# Patient Record
Sex: MALE | Race: WHITE | NOT HISPANIC OR LATINO | URBAN - METROPOLITAN AREA
[De-identification: names, ages, dates, MRNs, and addresses within clinical notes are randomized per-mention and may not be internally consistent; named-entity substitution may affect disease eponyms.]

---

## 2024-02-05 DIAGNOSIS — Z76.0 MEDICATION REFILL: ICD-10-CM

## 2024-02-05 RX ORDER — LISINOPRIL 10 MG/1
10 TABLET ORAL DAILY
Qty: 90 TABLET | Refills: 1 | Status: SHIPPED | OUTPATIENT
Start: 2024-02-05

## 2024-02-05 RX ORDER — LISINOPRIL 10 MG/1
10 TABLET ORAL DAILY
COMMUNITY
End: 2024-02-05 | Stop reason: SDUPTHER

## 2024-02-05 NOTE — TELEPHONE ENCOUNTER
Pt's wife left  requesting a refill of pt's lisinopril to be sent to [facility] pharmacy. Pt's wife is requesting a 90-day supply.

## 2024-08-05 DIAGNOSIS — Z76.0 MEDICATION REFILL: ICD-10-CM

## 2024-08-05 RX ORDER — LISINOPRIL 10 MG/1
10 TABLET ORAL DAILY
Qty: 30 TABLET | Refills: 0 | Status: SHIPPED | OUTPATIENT
Start: 2024-08-05

## 2024-08-05 NOTE — TELEPHONE ENCOUNTER
Pt is not a current patient, has not been seen since 12/18/2018. Pharmacy requesting refill(s) of the populated rx(s). Please refuse rx.

## 2025-04-22 ENCOUNTER — APPOINTMENT (OUTPATIENT)
Dept: PRIMARY CARE | Facility: CLINIC | Age: 65
End: 2025-04-22
Payer: COMMERCIAL

## 2025-05-04 NOTE — PROGRESS NOTES
"Subjective   Reji BRIAN Llanos is a 64 y.o. male who presents for new patient visit to establish PCP care care for annual physical exam.    HPI:        64 y.o. male who presents for new patient visit to establish PCP care care for annual physical exam.     EMR/EPIC records reviewed    PMHx:  HTN       Healthcare Providers:  Prior PCP Dr. Carlos        Preventive Health Services:  -Last physical: 5/6/2025  -last PSA: 2018  -last colonoscopy/cologuard: ?  -last STI screening: ?  -Hep C screening: ?       Immunizations:  -Childhood vaccines: completed per patient  -updated COVID spike vaccine: NOW DUE  -TDAP: 2019  -Prevnar 20: DUE NOW  - Shingrix: Now due  - RSV: NOW DUE    Immunization History   Administered Date(s) Administered    Moderna COVID-19 vaccine, 12 years and older (50mcg/0.5mL)(Spikevax) 10/11/2024    Moderna SARS-CoV-2 Vaccination 03/15/2021, 04/12/2021, 12/30/2021                  Today Reji reports:     - doing and feeling well    - Ran out of blood pressure medications and is not taking, and is requesting refills of lisinopril 10 mg daily     -taking taking over-the-counter Flonase for seasonal allergies.  Patient expressed interest in starting cetirizine 10 mg daily for ongoing seasonal allergies sxs, not worsening over time.       Today  he has no other reported complaints, issues, or problems.  ROS is NEG for HEADACHE, NAUSEA, VOMITING, DIARRHEA, CHEST PAIN, SOB, and BLEEDING.   Review of systems (12) is negative for all systems except for any identified issues in HPI above.        Objective     BP (!) 150/98   Pulse 93   Temp 36.7 °C (98 °F)   Ht 1.67 m (5' 5.75\")   Wt 107 kg (235 lb 9.6 oz)   SpO2 97%   BMI 38.32 kg/m²      Physical Examination: patient declined chaperone       GENERAL           General Appearance: pleasant, well-appearing, well-developed, well-hydrated, well-nourished, well-groomed.        HEENT           NECK supple, Neg for adneopathy no thyroid enlargement or " nodules, Oropharynx normal no exudates.  Ears: TMs intact, normal, no effusions       EYES           Pupils: PERRLA, no photophobia.        HEART           Rate and Rhythm regular rate and rhythm. Heart sounds: normal S1S2. Murmurs: none.        LUNGS           Effort: Normal chest wall, no pectus, Normal air entry all fields, Clear to IPPA, RR<16 with no use of accessory muscles.        BACK           General: unremarkable, no spinal tenderness or rashes.        ABDOMEN           General: soft, obese, nondistended nontender to palpation, + palpable ventral wall abdominal hernia versus diastases recti with no signs of incarceration, no peritoneal signs, normal to inspection, no HSM neg for LKKS or masses and BSs heard in all quadrants                 LYMPHATICS           Cervical: none. Axillary: none.        MUSCULOSKELETAL           gross abnormalities no gross abnormalities, no joint redness or swelling.        EXTREMITIES           Varicose veins: not present. Pulses: 2+ bilateral. Clubbing: none. Cyanosis: no.        NEUROLOGICAL           Orientation: alert and oriented x 3. Grossly normal: yes. Plantars: downgoing bilaterally. Muscle Bulk: normal . Cranial Nerves: CN's II-XII grossly intact.        PSYCHOLOGY           Affect: appropriate. Mood: pleasant.        Skin: No rashes.  No lesions       (patient declined chaperone): Penis: normal circumcised phallus no lesions. Scrotum: no lesions. Testicles: descended bilaterally, non-tender to palpation, no palpable masses or inguinal hernias. No inguinal LAD.      Assessment/Plan   Problem List Items Addressed This Visit    None  Visit Diagnoses         Annual physical exam    -  Primary      Primary hypertension          Lipid screening          Vitamin D deficiency          Encounter for hepatitis C screening test for low risk patient          Routine screening for STI (sexually transmitted infection)          Prostate cancer screening          Colon cancer  screening          Immunization counseling          Encounter for screening for coronary artery disease          Medication refill              Annual Physical Exam: Completed today  -labs ordered (see A/P above)    HTN: uncontrolled; ran out of blood pressure medications  -CMP, UA ordered  -start lisinopril 10 mg daily in AM  -low salt, low cholesterol diet, regularly exercise, and limit alcohol intake    Snoring: Rule out sleep apnea  - Referral to sleep medicine ordered    Seasonal allergies: Symptomatic  - Continue OTC Flonase daily  - Start cetirizine 10 mg daily    BMI 38:  - Weight loss encouraged  - Low carbohydrate, low-salt, low-cholesterol and fat diet, regularly exercise and limit alcohol intake    Ventral wall abdominal hernia: no red flag signs or symptoms  - Referral to general surgery ordered for evaluation  - Emergency department and 911/EMS precautions discussed and reviewed with patient     Lipid Screening  -lipid panel ordered     Vitamin D deficiency  -Vit D levels ordered     Hep C screening  -Hep C antibody ordered     STI Screening: Patient declined HIV, syphilis, GC/CT/trich screening    Prostate cancer screening  - PSA ordered    Colon Cancer Screening: Now due  - Cologuard and colonoscopy ordered; patient advised to only complete one of the screenings    Heart Disease Screening:  -CT Heart Ca scoring ordered       Counseling:      Medication education:        Education:  The patient is counseled regarding potential side-effects of all new medications        Understanding:  Patient expressed understanding        Adherence:  No barriers to adherence identified        Immunizations Counseling  - Prevnar 20, RSV, shingles, and TDAP now due==> received Prevnar 20 vaccine today  -recommend updated COVID spike vaccine that can be obtained at local pharmacy     FOLLOW-UP: 4 weeks to discuss and review test results and for BP check    I have personally reviewed all available pertinent labs,  imaging, and consult notes with the patient.     Discussed recommended plan of care with patient. Patient expressed understanding and agreement with plan of care. All of patient's  questions were answered at the time. Patient had no additional questions at the time.           Emmanuelle Butcher M.D.

## 2025-05-06 ENCOUNTER — OFFICE VISIT (OUTPATIENT)
Dept: PRIMARY CARE | Facility: CLINIC | Age: 65
End: 2025-05-06
Payer: COMMERCIAL

## 2025-05-06 VITALS
BODY MASS INDEX: 37.86 KG/M2 | WEIGHT: 235.6 LBS | HEART RATE: 93 BPM | HEIGHT: 66 IN | DIASTOLIC BLOOD PRESSURE: 98 MMHG | TEMPERATURE: 98 F | OXYGEN SATURATION: 97 % | SYSTOLIC BLOOD PRESSURE: 150 MMHG

## 2025-05-06 DIAGNOSIS — Z00.00 ANNUAL PHYSICAL EXAM: Primary | ICD-10-CM

## 2025-05-06 DIAGNOSIS — R06.83 SNORING: ICD-10-CM

## 2025-05-06 DIAGNOSIS — Z12.5 PROSTATE CANCER SCREENING: ICD-10-CM

## 2025-05-06 DIAGNOSIS — Z76.0 MEDICATION REFILL: ICD-10-CM

## 2025-05-06 DIAGNOSIS — I10 PRIMARY HYPERTENSION: ICD-10-CM

## 2025-05-06 DIAGNOSIS — K43.9 VENTRAL HERNIA WITHOUT OBSTRUCTION OR GANGRENE: ICD-10-CM

## 2025-05-06 DIAGNOSIS — Z13.6 ENCOUNTER FOR SCREENING FOR CORONARY ARTERY DISEASE: ICD-10-CM

## 2025-05-06 DIAGNOSIS — Z11.59 ENCOUNTER FOR HEPATITIS C SCREENING TEST FOR LOW RISK PATIENT: ICD-10-CM

## 2025-05-06 DIAGNOSIS — Z12.11 COLON CANCER SCREENING: ICD-10-CM

## 2025-05-06 DIAGNOSIS — J30.2 SEASONAL ALLERGIES: ICD-10-CM

## 2025-05-06 DIAGNOSIS — Z71.85 IMMUNIZATION COUNSELING: ICD-10-CM

## 2025-05-06 DIAGNOSIS — Z23 ENCOUNTER FOR ADMINISTRATION OF VACCINE: ICD-10-CM

## 2025-05-06 DIAGNOSIS — Z11.3 ROUTINE SCREENING FOR STI (SEXUALLY TRANSMITTED INFECTION): ICD-10-CM

## 2025-05-06 DIAGNOSIS — Z13.220 LIPID SCREENING: ICD-10-CM

## 2025-05-06 DIAGNOSIS — E55.9 VITAMIN D DEFICIENCY: ICD-10-CM

## 2025-05-06 PROCEDURE — 1036F TOBACCO NON-USER: CPT | Performed by: FAMILY MEDICINE

## 2025-05-06 PROCEDURE — 99386 PREV VISIT NEW AGE 40-64: CPT | Performed by: FAMILY MEDICINE

## 2025-05-06 PROCEDURE — 3080F DIAST BP >= 90 MM HG: CPT | Performed by: FAMILY MEDICINE

## 2025-05-06 PROCEDURE — 3077F SYST BP >= 140 MM HG: CPT | Performed by: FAMILY MEDICINE

## 2025-05-06 PROCEDURE — 90677 PCV20 VACCINE IM: CPT | Performed by: FAMILY MEDICINE

## 2025-05-06 PROCEDURE — 3008F BODY MASS INDEX DOCD: CPT | Performed by: FAMILY MEDICINE

## 2025-05-06 PROCEDURE — 90471 IMMUNIZATION ADMIN: CPT | Performed by: FAMILY MEDICINE

## 2025-05-06 RX ORDER — LISINOPRIL 10 MG/1
10 TABLET ORAL DAILY
Qty: 90 TABLET | Refills: 3 | Status: SHIPPED | OUTPATIENT
Start: 2025-05-06 | End: 2026-05-06

## 2025-05-06 RX ORDER — CETIRIZINE HYDROCHLORIDE 10 MG/1
10 TABLET ORAL DAILY
Qty: 90 TABLET | Refills: 3 | Status: SHIPPED | OUTPATIENT
Start: 2025-05-06 | End: 2026-05-06

## 2025-05-06 RX ORDER — LISINOPRIL 10 MG/1
10 TABLET ORAL DAILY
Qty: 30 TABLET | Refills: 0 | Status: CANCELLED | OUTPATIENT
Start: 2025-05-06

## 2025-05-06 ASSESSMENT — COLUMBIA-SUICIDE SEVERITY RATING SCALE - C-SSRS
1. IN THE PAST MONTH, HAVE YOU WISHED YOU WERE DEAD OR WISHED YOU COULD GO TO SLEEP AND NOT WAKE UP?: NO
2. HAVE YOU ACTUALLY HAD ANY THOUGHTS OF KILLING YOURSELF?: NO
6. HAVE YOU EVER DONE ANYTHING, STARTED TO DO ANYTHING, OR PREPARED TO DO ANYTHING TO END YOUR LIFE?: NO

## 2025-05-06 ASSESSMENT — PATIENT HEALTH QUESTIONNAIRE - PHQ9
SUM OF ALL RESPONSES TO PHQ9 QUESTIONS 1 AND 2: 0
2. FEELING DOWN, DEPRESSED OR HOPELESS: NOT AT ALL
1. LITTLE INTEREST OR PLEASURE IN DOING THINGS: NOT AT ALL

## 2025-05-06 ASSESSMENT — PAIN SCALES - GENERAL: PAINLEVEL_OUTOF10: 0-NO PAIN

## 2025-05-08 PROBLEM — E78.5 HYPERLIPIDEMIA: Status: ACTIVE | Noted: 2025-05-08

## 2025-05-08 PROBLEM — I10 ESSENTIAL HYPERTENSION: Status: ACTIVE | Noted: 2025-05-08

## 2025-05-08 PROBLEM — D58.2 ELEVATED HEMOGLOBIN: Status: ACTIVE | Noted: 2025-05-08

## 2025-05-15 ENCOUNTER — TELEPHONE (OUTPATIENT)
Dept: SURGERY | Facility: CLINIC | Age: 65
End: 2025-05-15
Payer: COMMERCIAL

## 2025-05-15 NOTE — TELEPHONE ENCOUNTER
Spoke with patient, identified by name and .   Spoke with PT and he would prefer to keep his 8AM appt and not come in at 12PM. I let PT know that Dr. Mendez may be running a little late.  Patient verbalized understanding and denies further questions or concerns at this time.

## 2025-05-16 ENCOUNTER — OFFICE VISIT (OUTPATIENT)
Dept: SURGERY | Facility: CLINIC | Age: 65
End: 2025-05-16
Payer: COMMERCIAL

## 2025-05-16 VITALS
DIASTOLIC BLOOD PRESSURE: 87 MMHG | SYSTOLIC BLOOD PRESSURE: 148 MMHG | BODY MASS INDEX: 38.95 KG/M2 | TEMPERATURE: 98.2 F | HEART RATE: 68 BPM | HEIGHT: 65 IN | WEIGHT: 233.8 LBS | OXYGEN SATURATION: 95 %

## 2025-05-16 DIAGNOSIS — K43.9 VENTRAL HERNIA WITHOUT OBSTRUCTION OR GANGRENE: ICD-10-CM

## 2025-05-16 DIAGNOSIS — M62.08 DIASTASIS OF RECTUS ABDOMINIS: Primary | ICD-10-CM

## 2025-05-16 PROCEDURE — 3079F DIAST BP 80-89 MM HG: CPT | Performed by: STUDENT IN AN ORGANIZED HEALTH CARE EDUCATION/TRAINING PROGRAM

## 2025-05-16 PROCEDURE — 99215 OFFICE O/P EST HI 40 MIN: CPT | Performed by: STUDENT IN AN ORGANIZED HEALTH CARE EDUCATION/TRAINING PROGRAM

## 2025-05-16 PROCEDURE — 3008F BODY MASS INDEX DOCD: CPT | Performed by: STUDENT IN AN ORGANIZED HEALTH CARE EDUCATION/TRAINING PROGRAM

## 2025-05-16 PROCEDURE — 3077F SYST BP >= 140 MM HG: CPT | Performed by: STUDENT IN AN ORGANIZED HEALTH CARE EDUCATION/TRAINING PROGRAM

## 2025-05-16 PROCEDURE — 99205 OFFICE O/P NEW HI 60 MIN: CPT | Performed by: STUDENT IN AN ORGANIZED HEALTH CARE EDUCATION/TRAINING PROGRAM

## 2025-05-16 ASSESSMENT — PATIENT HEALTH QUESTIONNAIRE - PHQ9
SUM OF ALL RESPONSES TO PHQ9 QUESTIONS 1 AND 2: 0
1. LITTLE INTEREST OR PLEASURE IN DOING THINGS: NOT AT ALL
2. FEELING DOWN, DEPRESSED OR HOPELESS: NOT AT ALL

## 2025-05-16 ASSESSMENT — PAIN SCALES - GENERAL: PAINLEVEL_OUTOF10: 0-NO PAIN

## 2025-05-28 PROBLEM — M62.08 DIASTASIS OF RECTUS ABDOMINIS: Status: ACTIVE | Noted: 2025-05-28

## 2025-05-28 PROBLEM — K43.9 VENTRAL HERNIA WITHOUT OBSTRUCTION OR GANGRENE: Status: ACTIVE | Noted: 2025-05-28

## 2025-05-28 NOTE — PROGRESS NOTES
Subjective   Patient ID: Reji Llanos is a 64 y.o. male who presents for New Patient Visit (Ventral hernia/ref by Dr. Butcher/No imaging done/No colonoscopy on file ).  64M hx HTN, referred to General Surgery with ventral hernia vs diastasis recti.  Patient indicates he has established a new PCP and was referred here to evaluate hernia.  No issues with abdominal pain, nausea, vomiting, constipation.  No overlying skin changes.  Patient has noticed a bulge when he goes to stand up or bend over, but it never gets stuck or causes pain.  Has been monitoring his diet and endorses weightloss with eating better at home.    Reviewed meds: lisinopril, zyrtec  NKDA  No previous abdominal surgeries        Review of Systems   All other systems reviewed and are negative.      Objective   Physical Exam  Vitals and nursing note reviewed.   Constitutional:       General: He is not in acute distress.     Appearance: He is not ill-appearing.   HENT:      Head: Normocephalic and atraumatic.      Right Ear: External ear normal.      Left Ear: External ear normal.      Nose: Nose normal.      Mouth/Throat:      Mouth: Mucous membranes are moist.      Pharynx: Oropharynx is clear.   Eyes:      General: No scleral icterus.     Conjunctiva/sclera: Conjunctivae normal.   Cardiovascular:      Rate and Rhythm: Normal rate.      Pulses: Normal pulses.   Pulmonary:      Effort: Pulmonary effort is normal. No respiratory distress.      Breath sounds: Normal breath sounds.   Abdominal:      General: There is no distension.      Palpations: Abdomen is soft.      Tenderness: There is no abdominal tenderness.      Hernia: No hernia is present.      Comments: No palpable fascial defect or hernia  Widening of rectus muscles with central bulge, soft, nontender to palpation   Musculoskeletal:      Cervical back: Normal range of motion. No rigidity.      Right lower leg: No edema.      Left lower leg: No edema.   Skin:     Coloration: Skin is not  jaundiced.      Findings: No bruising.   Neurological:      General: No focal deficit present.      Mental Status: He is alert. Mental status is at baseline.      Sensory: No sensory deficit.      Motor: No weakness.   Psychiatric:         Mood and Affect: Mood normal.         Behavior: Behavior normal.         Thought Content: Thought content normal.         Judgment: Judgment normal.         Assessment/Plan   Problem List Items Addressed This Visit           ICD-10-CM    Ventral hernia without obstruction or gangrene K43.9    Diastasis of rectus abdominis - Primary M62.08     64M w no abdominal surgical hx, with likely diastasis recti.  Counseled patient regarding health habits, strengthening and options for PT.     Patient would like to forego PT at this time and will continue to monitor, continuing to improve his diet habits    Will follow up with General Surgery as needed  Will likely obtain CT if patient notices new issues with his abdominal bulge    Discussed signs that would require new evaluation or possible emergency surgery:  - Severe abdominal pain with nausea, vomiting, constipation (obstruction)  - 'Stuck' hernia.   - Hard mass at hernia site  - Skin changes at hernia site